# Patient Record
Sex: FEMALE | URBAN - METROPOLITAN AREA
[De-identification: names, ages, dates, MRNs, and addresses within clinical notes are randomized per-mention and may not be internally consistent; named-entity substitution may affect disease eponyms.]

---

## 2023-02-28 ENCOUNTER — APPOINTMENT (OUTPATIENT)
Dept: URBAN - METROPOLITAN AREA CLINIC 206 | Age: 23
Setting detail: DERMATOLOGY
End: 2023-03-02

## 2023-02-28 ENCOUNTER — RX ONLY (RX ONLY)
Age: 23
End: 2023-02-28

## 2023-02-28 DIAGNOSIS — L74.51 PRIMARY FOCAL HYPERHIDROSIS: ICD-10-CM

## 2023-02-28 PROBLEM — L74.510 PRIMARY FOCAL HYPERHIDROSIS, AXILLA: Status: ACTIVE | Noted: 2023-02-28

## 2023-02-28 PROCEDURE — OTHER MIPS QUALITY: OTHER

## 2023-02-28 PROCEDURE — OTHER PRESCRIPTION MEDICATION MANAGEMENT: OTHER

## 2023-02-28 PROCEDURE — OTHER COUNSELING: OTHER

## 2023-02-28 PROCEDURE — 99204 OFFICE O/P NEW MOD 45 MIN: CPT

## 2023-02-28 PROCEDURE — OTHER OTHER: OTHER

## 2023-02-28 PROCEDURE — OTHER PRESCRIPTION: OTHER

## 2023-02-28 RX ORDER — GLYCOPYRROLATE 2 MG/1
TABLET ORAL
Qty: 90 | Refills: 3 | Status: ERX | COMMUNITY
Start: 2023-02-28

## 2023-02-28 RX ORDER — PHARMACY COMPOUNDING ACCESSORY
EACH MISCELLANEOUS
Qty: 30 | Refills: 3 | Status: ERX | COMMUNITY
Start: 2023-02-28

## 2023-02-28 ASSESSMENT — LOCATION ZONE DERM: LOCATION ZONE: AXILLAE

## 2023-02-28 ASSESSMENT — LOCATION DETAILED DESCRIPTION DERM
LOCATION DETAILED: LEFT AXILLARY VAULT
LOCATION DETAILED: RIGHT AXILLARY VAULT

## 2023-02-28 ASSESSMENT — LOCATION SIMPLE DESCRIPTION DERM
LOCATION SIMPLE: LEFT AXILLARY VAULT
LOCATION SIMPLE: RIGHT AXILLARY VAULT

## 2023-02-28 NOTE — PROCEDURE: OTHER
Detail Level: Zone
Other (Free Text): -denies issues with thyroid \\n-just under arms\\n-discussed different treatment options\\n-pt opted for topicals first but wanted Sravani sent in case as Qbrexza is not covered
Note Text (......Xxx Chief Complaint.): This diagnosis correlates with the
Render Risk Assessment In Note?: no

## 2023-02-28 NOTE — HPI: SWEATING (HYPERHIDROSIS)
How Severe Is It?: moderate
Is This A New Presentation, Or A Follow-Up?: Sweating
Sweating Severity Scale: 4- The sweating is intolerable and always interferes with daily activities
Additional History: Pt states she had brought this up to a prev derm and was RXd something topical but never used. Pt uses Dove clinical deodorant she used to us [employer]. Would prefer to use a more natural deodorant, pt seeking recommendations.

## 2023-02-28 NOTE — PROCEDURE: PRESCRIPTION MEDICATION MANAGEMENT
Continue Regimen: -can continue Dove antiperspirant
Detail Level: Zone
Plan: -chronic condition\\n-we discussed treatment options such as topical called Drysol vs oral called Robinul which is Anticholinergic (can cause systemic side effects) vs Qbrexza wipes (very costly) vs Miradry (not covered by insurance price is $1950 for first treatment and $950 for second treatment, to schedule appt you will need to pay a $500 deposit) vs Botox, which we do not do in our office
Initiate Treatment: -Yolette deodorant over the counter (online)\\n-Robinul generic 1-3 pills daily. Do not take 3 pills, slowly start with it
Render In Strict Bullet Format?: Yes

## 2023-09-27 ENCOUNTER — APPOINTMENT (OUTPATIENT)
Dept: URBAN - METROPOLITAN AREA CLINIC 206 | Age: 23
Setting detail: DERMATOLOGY
End: 2023-09-27

## 2023-09-27 DIAGNOSIS — D22 MELANOCYTIC NEVI: ICD-10-CM

## 2023-09-27 DIAGNOSIS — L81.4 OTHER MELANIN HYPERPIGMENTATION: ICD-10-CM

## 2023-09-27 PROBLEM — D22.5 MELANOCYTIC NEVI OF TRUNK: Status: ACTIVE | Noted: 2023-09-27

## 2023-09-27 PROCEDURE — OTHER COUNSELING: OTHER

## 2023-09-27 PROCEDURE — OTHER MIPS QUALITY: OTHER

## 2023-09-27 PROCEDURE — 99213 OFFICE O/P EST LOW 20 MIN: CPT

## 2023-09-27 ASSESSMENT — LOCATION ZONE DERM
LOCATION ZONE: TRUNK
LOCATION ZONE: ARM

## 2023-09-27 ASSESSMENT — LOCATION DETAILED DESCRIPTION DERM
LOCATION DETAILED: LEFT ANTERIOR PROXIMAL UPPER ARM
LOCATION DETAILED: RIGHT MEDIAL SUPERIOR CHEST

## 2023-09-27 ASSESSMENT — LOCATION SIMPLE DESCRIPTION DERM
LOCATION SIMPLE: LEFT UPPER ARM
LOCATION SIMPLE: CHEST

## 2023-09-27 NOTE — PROCEDURE: MIPS QUALITY
Quality 226: Preventive Care And Screening: Tobacco Use: Screening And Cessation Intervention: Patient screened for tobacco use and is an ex/non-smoker
Detail Level: Detailed
1.47